# Patient Record
Sex: MALE | Race: WHITE | NOT HISPANIC OR LATINO | URBAN - METROPOLITAN AREA
[De-identification: names, ages, dates, MRNs, and addresses within clinical notes are randomized per-mention and may not be internally consistent; named-entity substitution may affect disease eponyms.]

---

## 2022-12-02 ENCOUNTER — EMERGENCY (EMERGENCY)
Facility: HOSPITAL | Age: 37
LOS: 1 days | Discharge: HOME | End: 2022-12-02
Attending: STUDENT IN AN ORGANIZED HEALTH CARE EDUCATION/TRAINING PROGRAM | Admitting: STUDENT IN AN ORGANIZED HEALTH CARE EDUCATION/TRAINING PROGRAM
Payer: COMMERCIAL

## 2022-12-02 VITALS
OXYGEN SATURATION: 99 % | HEIGHT: 60 IN | TEMPERATURE: 100 F | WEIGHT: 85.1 LBS | SYSTOLIC BLOOD PRESSURE: 133 MMHG | RESPIRATION RATE: 16 BRPM | HEART RATE: 70 BPM | DIASTOLIC BLOOD PRESSURE: 79 MMHG

## 2022-12-02 VITALS — RESPIRATION RATE: 16 BRPM | TEMPERATURE: 98 F | OXYGEN SATURATION: 99 % | HEART RATE: 74 BPM

## 2022-12-02 DIAGNOSIS — M48.02 SPINAL STENOSIS, CERVICAL REGION: ICD-10-CM

## 2022-12-02 DIAGNOSIS — R05.9 COUGH, UNSPECIFIED: ICD-10-CM

## 2022-12-02 DIAGNOSIS — R07.89 OTHER CHEST PAIN: ICD-10-CM

## 2022-12-02 DIAGNOSIS — M25.78 OSTEOPHYTE, VERTEBRAE: ICD-10-CM

## 2022-12-02 DIAGNOSIS — R50.9 FEVER, UNSPECIFIED: ICD-10-CM

## 2022-12-02 DIAGNOSIS — R09.89 OTHER SPECIFIED SYMPTOMS AND SIGNS INVOLVING THE CIRCULATORY AND RESPIRATORY SYSTEMS: ICD-10-CM

## 2022-12-02 DIAGNOSIS — Z20.822 CONTACT WITH AND (SUSPECTED) EXPOSURE TO COVID-19: ICD-10-CM

## 2022-12-02 DIAGNOSIS — R07.0 PAIN IN THROAT: ICD-10-CM

## 2022-12-02 LAB
ALBUMIN SERPL ELPH-MCNC: 4.8 G/DL — SIGNIFICANT CHANGE UP (ref 3.3–5)
ALP SERPL-CCNC: 64 U/L — SIGNIFICANT CHANGE UP (ref 40–120)
ALT FLD-CCNC: 33 U/L — SIGNIFICANT CHANGE UP (ref 10–45)
ANION GAP SERPL CALC-SCNC: 11 MMOL/L — SIGNIFICANT CHANGE UP (ref 5–17)
APTT BLD: 26.9 SEC — LOW (ref 27.5–35.5)
AST SERPL-CCNC: 42 U/L — HIGH (ref 10–40)
BASOPHILS # BLD AUTO: 0.06 K/UL — SIGNIFICANT CHANGE UP (ref 0–0.2)
BASOPHILS NFR BLD AUTO: 0.7 % — SIGNIFICANT CHANGE UP (ref 0–2)
BILIRUB SERPL-MCNC: 0.4 MG/DL — SIGNIFICANT CHANGE UP (ref 0.2–1.2)
BUN SERPL-MCNC: 24 MG/DL — HIGH (ref 7–23)
CALCIUM SERPL-MCNC: 9.4 MG/DL — SIGNIFICANT CHANGE UP (ref 8.4–10.5)
CHLORIDE SERPL-SCNC: 101 MMOL/L — SIGNIFICANT CHANGE UP (ref 96–108)
CO2 SERPL-SCNC: 25 MMOL/L — SIGNIFICANT CHANGE UP (ref 22–31)
CREAT SERPL-MCNC: 1.34 MG/DL — HIGH (ref 0.5–1.3)
EGFR: 70 ML/MIN/1.73M2 — SIGNIFICANT CHANGE UP
EOSINOPHIL # BLD AUTO: 0.09 K/UL — SIGNIFICANT CHANGE UP (ref 0–0.5)
EOSINOPHIL NFR BLD AUTO: 1.1 % — SIGNIFICANT CHANGE UP (ref 0–6)
GLUCOSE SERPL-MCNC: 118 MG/DL — HIGH (ref 70–99)
HCT VFR BLD CALC: 44.1 % — SIGNIFICANT CHANGE UP (ref 39–50)
HGB BLD-MCNC: 14.8 G/DL — SIGNIFICANT CHANGE UP (ref 13–17)
IMM GRANULOCYTES NFR BLD AUTO: 0.1 % — SIGNIFICANT CHANGE UP (ref 0–0.9)
INR BLD: 1 — SIGNIFICANT CHANGE UP (ref 0.88–1.16)
LYMPHOCYTES # BLD AUTO: 2.15 K/UL — SIGNIFICANT CHANGE UP (ref 1–3.3)
LYMPHOCYTES # BLD AUTO: 26.2 % — SIGNIFICANT CHANGE UP (ref 13–44)
MCHC RBC-ENTMCNC: 29.7 PG — SIGNIFICANT CHANGE UP (ref 27–34)
MCHC RBC-ENTMCNC: 33.6 GM/DL — SIGNIFICANT CHANGE UP (ref 32–36)
MCV RBC AUTO: 88.6 FL — SIGNIFICANT CHANGE UP (ref 80–100)
MONOCYTES # BLD AUTO: 0.47 K/UL — SIGNIFICANT CHANGE UP (ref 0–0.9)
MONOCYTES NFR BLD AUTO: 5.7 % — SIGNIFICANT CHANGE UP (ref 2–14)
NEUTROPHILS # BLD AUTO: 5.43 K/UL — SIGNIFICANT CHANGE UP (ref 1.8–7.4)
NEUTROPHILS NFR BLD AUTO: 66.2 % — SIGNIFICANT CHANGE UP (ref 43–77)
NRBC # BLD: 0 /100 WBCS — SIGNIFICANT CHANGE UP (ref 0–0)
PLATELET # BLD AUTO: 210 K/UL — SIGNIFICANT CHANGE UP (ref 150–400)
POTASSIUM SERPL-MCNC: 4.2 MMOL/L — SIGNIFICANT CHANGE UP (ref 3.5–5.3)
POTASSIUM SERPL-SCNC: 4.2 MMOL/L — SIGNIFICANT CHANGE UP (ref 3.5–5.3)
PROT SERPL-MCNC: 7.8 G/DL — SIGNIFICANT CHANGE UP (ref 6–8.3)
PROTHROM AB SERPL-ACNC: 11.9 SEC — SIGNIFICANT CHANGE UP (ref 10.5–13.4)
RBC # BLD: 4.98 M/UL — SIGNIFICANT CHANGE UP (ref 4.2–5.8)
RBC # FLD: 13.1 % — SIGNIFICANT CHANGE UP (ref 10.3–14.5)
SARS-COV-2 RNA SPEC QL NAA+PROBE: NEGATIVE — SIGNIFICANT CHANGE UP
SODIUM SERPL-SCNC: 137 MMOL/L — SIGNIFICANT CHANGE UP (ref 135–145)
WBC # BLD: 8.21 K/UL — SIGNIFICANT CHANGE UP (ref 3.8–10.5)
WBC # FLD AUTO: 8.21 K/UL — SIGNIFICANT CHANGE UP (ref 3.8–10.5)

## 2022-12-02 PROCEDURE — 96375 TX/PRO/DX INJ NEW DRUG ADDON: CPT

## 2022-12-02 PROCEDURE — 71250 CT THORAX DX C-: CPT | Mod: MG

## 2022-12-02 PROCEDURE — G1004: CPT

## 2022-12-02 PROCEDURE — 85025 COMPLETE CBC W/AUTO DIFF WBC: CPT

## 2022-12-02 PROCEDURE — 96374 THER/PROPH/DIAG INJ IV PUSH: CPT

## 2022-12-02 PROCEDURE — 85730 THROMBOPLASTIN TIME PARTIAL: CPT

## 2022-12-02 PROCEDURE — 71250 CT THORAX DX C-: CPT | Mod: 26,MG

## 2022-12-02 PROCEDURE — 93005 ELECTROCARDIOGRAM TRACING: CPT

## 2022-12-02 PROCEDURE — 99284 EMERGENCY DEPT VISIT MOD MDM: CPT

## 2022-12-02 PROCEDURE — 70490 CT SOFT TISSUE NECK W/O DYE: CPT | Mod: 26,MG

## 2022-12-02 PROCEDURE — 99285 EMERGENCY DEPT VISIT HI MDM: CPT | Mod: 25

## 2022-12-02 PROCEDURE — 36415 COLL VENOUS BLD VENIPUNCTURE: CPT

## 2022-12-02 PROCEDURE — 87635 SARS-COV-2 COVID-19 AMP PRB: CPT

## 2022-12-02 PROCEDURE — 70490 CT SOFT TISSUE NECK W/O DYE: CPT | Mod: MG

## 2022-12-02 PROCEDURE — 85610 PROTHROMBIN TIME: CPT

## 2022-12-02 PROCEDURE — 80053 COMPREHEN METABOLIC PANEL: CPT

## 2022-12-02 RX ORDER — SODIUM CHLORIDE 9 MG/ML
1000 INJECTION INTRAMUSCULAR; INTRAVENOUS; SUBCUTANEOUS ONCE
Refills: 0 | Status: COMPLETED | OUTPATIENT
Start: 2022-12-02 | End: 2022-12-02

## 2022-12-02 RX ORDER — ACETAMINOPHEN 500 MG
575 TABLET ORAL ONCE
Refills: 0 | Status: COMPLETED | OUTPATIENT
Start: 2022-12-02 | End: 2022-12-02

## 2022-12-02 RX ORDER — FAMOTIDINE 10 MG/ML
20 INJECTION INTRAVENOUS ONCE
Refills: 0 | Status: COMPLETED | OUTPATIENT
Start: 2022-12-02 | End: 2022-12-02

## 2022-12-02 RX ADMIN — Medication 230 MILLIGRAM(S): at 21:33

## 2022-12-02 RX ADMIN — FAMOTIDINE 20 MILLIGRAM(S): 10 INJECTION INTRAVENOUS at 20:56

## 2022-12-02 RX ADMIN — SODIUM CHLORIDE 1000 MILLILITER(S): 9 INJECTION INTRAMUSCULAR; INTRAVENOUS; SUBCUTANEOUS at 20:56

## 2022-12-02 NOTE — ED PROVIDER NOTE - NSFOLLOWUPINSTRUCTIONS_ED_ALL_ED_FT
Please reach out to Sari Britton ([facility] ED clinical referral coordinator) to assist you with your follow-up appointment.     Monday - Friday 11am-7pm  [phone]  [email or web address]    Please call the Gastroenterology office: [phone] on Monday to make a followup appointment.    1. You were seen for sensation in throat. A copy of any of your resulted labs, imaging, and findings have been provided to you. Make sure to view any test results that may not have yet resulted at the time of your discharge by creating a FollowMyHealth account at: [email or web address] to sign up for FollowMyHealth. Please review all of your lab, imaging, and all other results in their entirety with your primary care doctor.   2. Continue to take your home medications as prescribed.   3. Follow up with the gastroenterologist (see above) and your primary care doctor within 48 hours to assess the symptoms you were seen for in the emergency department and to review all results from your visit. If you don't have a doctor, call [phone] to make an appointment.  4. Return immediately to the emergency department for new, persistent, or worsening symptoms or signs. Return immediately to the emergency department if you have chest pain, shortness of breath, loss of consciousness, hoarseness, drooling, trouble speaking or swallowing, persistent throat pain, chest pain, or trouble breathing.  5. For your for health, you should make healthy food choices and be physically active. Also, you should not smoke or use drugs, and you should not drink alcohol in excess. Please visit [email or web address] for resources and more information.

## 2022-12-02 NOTE — ED ADULT NURSE NOTE - CAS ELECT INFOMATION PROVIDED
Pt D/C with outpatient follow-up, D/C no distress, protecting airway, even unlabored breathing, speaking full sentences./DC instructions

## 2022-12-02 NOTE — ED PROVIDER NOTE - CLINICAL SUMMARY MEDICAL DECISION MAKING FREE TEXT BOX
36 y/o male w/ no sig pmh p/w c/f fish bone stuck in esophagus since 10pm last night.  States was eating branzino, swallowed bone and has been feeling sensation of foreign body at base of neck since.  States at time felt like he was choking and coughing a lot.  Did not receive Heimlich.  Tried to make self vomit without relief.  States last night felt slight chest tightness, thinks he was having a panic attack at time of event.  Has been able to tolerate both food and water throughout day today.  Presents to ED as foreign body sensation has not gone away.  Denies f/c, cough, sob, abd pain, n/d.  VS notable for fever (100.4), otherwise WNL  Pt tolerating secretions, speaking in full sentences.  No signs acute airway compromise at this time.  Unclear source of fever at this time  Plan for CT neck/chest to eval for retained foreign body  Will get basic labs, screening ekg, covid swab  IV tylenol for pain, NPO  Anticipate GI consult, re-eval

## 2022-12-02 NOTE — CONSULT NOTE ADULT - SUBJECTIVE AND OBJECTIVE BOX
HPI: 37y Male with no PMH presenting with possible foreign body. He reports a choking/coughing episode last night around 10pm while eating branzino for dinner. He reports a globus sensation that he localizes to cricoid region. Also reports some chest tightness. After the episode he tried to induce vomiting. He has been tolerating PO throughout the day. He denies shortness of breath, difficulty swallowing, heavy drooling, voice changes, or abdominal complaints. He denies fevers/sweats/chills.     In the ED noted to have low grade fever of 100.4. Vitals otherwise wnl. CBC wnl. BMP notable for Creatinine 1.34. CT neck and chest were negative for radiopaque foreign bodies.     ALLERGIES  No Known Allergies    PAST MEDICAL & SURGICAL HISTORY:  No pertinent past medical history  No significant past surgical history    MEDICATIONS:  None      SOCIAL HISTORY:  unknown    FAMILY HISTORY:  unknown    REVIEW OF SYSTEMS:   per HPI    LABS:  CBC-                        14.8   8.21  )-----------( 210      ( 02 Dec 2022 18:58 )             44.1     12-02    137  |  101  |  24<H>  ----------------------------<  118<H>  4.2   |  25  |  1.34<H>    Ca    9.4      02 Dec 2022 18:58    TPro  7.8  /  Alb  4.8  /  TBili  0.4  /  DBili  x   /  AST  42<H>  /  ALT  33  /  AlkPhos  64  12-02    Coagulation Studies-  PT/INR - ( 02 Dec 2022 18:58 )   PT: 11.9 sec;   INR: 1.00          PTT - ( 02 Dec 2022 18:58 )  PTT:26.9 sec  Endocrine Panel-  --  --  9.4 mg/dL      Vital Signs Last 24 Hrs  T(C): 38 (02 Dec 2022 17:46), Max: 38 (02 Dec 2022 17:46)  T(F): 100.4 (02 Dec 2022 17:46), Max: 100.4 (02 Dec 2022 17:46)  HR: 70 (02 Dec 2022 17:46) (70 - 70)  BP: 133/79 (02 Dec 2022 17:46) (133/79 - 133/79)  BP(mean): --  RR: 16 (02 Dec 2022 17:46) (16 - 16)  SpO2: 99% (02 Dec 2022 17:46) (99% - 99%)    Parameters below as of 02 Dec 2022 17:46  Patient On (Oxygen Delivery Method): room air    PHYSICAL EXAM:  General: NAD, A+Ox3  Respiratory: No respiratory distress, stridor, or stertor  Voice quality: normal  Face:  Symmetric without masses or lesions  OU: EOMI  AD: Pinna wnl  AS: Pinna wnl  Nose: nasal cavity clear anteriorly  OC/OP: tongue normal, floor of mouth WNL, no masses or lesions, OP clear, no foreign objects visualized  Neck: soft/flat, no LAD, no tenderness to palpation  Neuro: CNII-XII intact    LARYNGOSCOPY EXAM:   Verbal consent was obtained from patient prior to procedure.  Indication: possible foreign body  Anesthesia: none  Flexible laryngoscopy was performed and revealed the following:    Nasopharynx had no mass or exudate.    Base of tongue was symmetric and not enlarged.    Vallecula was clear    Epiglottis, both aryepiglottic folds and both false vocal folds were normal    + Arytenoids edema, no erythema     True vocal folds were fully mobile and without lesions.     + Post cricoid edema    + Interarytenoid edema  No foreign bodies visualized down to subglottis. No bleeding, clots, crusts, lacerations.  The patient tolerated the procedure well.      RADIOLOGY & ADDITIONAL STUDIES:  ACC: [id number] EXAM:  CT NECK SOFT TISSUE                        PROCEDURE DATE:  12/02/2022    INTERPRETATION:  Technique: A thin section axial acquisition was   performed from the skull base to the thoracic inlet.  The data was   reformatted in the sagittal, coronal and axial planes.  Contrast: None  Clinical Information: Foreign body sensation, suspected retained fishbone  Prior Studies: None  Findings:  *  Aerodigestive Tract: No radiopaque foreign body identified nor is   there a focal area of swelling or masslike asymmetry. No retropharyngeal   edema.  *  Lymph Nodes: No cervical lymphadenopathy  *  Salivary Glands: Unremarkable  *  Thyroid Gland: Mildly heterogeneous without enlargement or dominant   nodule  *  Orbits: No abnormal soft tissue or mass effect  *  Brain: Included portions are unremarkable  *  Skull Base: Unremarkable  *  Paranasal Sinuses: Mild sites of mucosal thickening without acute   disease.  *  Mastoids: Clear  *  Cervical Spine: No acute osseous finding. There is disc degeneration   with circumferential osteophyte at C5-6 with mild-moderate spinal   stenosis.  *  Lung Apices: Limited portions are unremarkable  IMPRESSION:  No upper aerodigestive tract foreign body identified.  --- End of Report ---  JOBY DAVIS MD; Attending Radiologist  This document has been electronically signed. Dec  2 2022  8:05PM      ACC: [id number] EXAM:  CT CHEST                        PROCEDURE DATE:  12/02/2022    INTERPRETATION:  CLINICAL INFORMATION: 37-year-old male  COMPARISON: None.  CONTRAST/COMPLICATIONS:  IV Contrast: None  Oral Contrast: None  Complications: None known  PROCEDURE:  CT of the Chest was performed.  Sagittal and coronal reformats were performed.  FINDINGS:  LUNGS AND AIRWAYS: Visualized central airways are patent. No radiopaque   foreign body is identified.  4 mm probable intrapulmonary lymph node   within the subpleural region of the medial aspect of the posterior basal   segment of the left lower lobe (13:65).  PLEURA: No pleural effusion.  MEDIASTINUM AND JING: No lymphadenopathy.  VESSELS: Within normal limits.  HEART: Heart size is normal. No pericardial effusion.  CHEST WALL AND LOWER NECK: 2 linear radiopaque structures seen in the   region of the posterior aspect of the thyroid cartilage  VISUALIZED UPPER ABDOMEN: Within normal limits. No radiopaque foreign   body identified within the esophagus or upper abdomen.  BONES: Within normal limits.  IMPRESSION:  1. No upper aerodigestive tract foreign body identified.  The results of this examination were verbally communicated with read back   to the physician, SAMANTHA HELLERMAN [id number], on 12/2/2022 at 9:44 PM.  --- End of Report ---  EDWARD ELLISON MD; Attending Radiologist  This document has been electronically signed. Dec  2 2022  9:45PM      A/P:  37y Male no PMH presenting with possible foreign body in aerodigestive track. CT neck and chest negative for radiopaque foreign body. Fiberoptic exam down to subglottis negative for foreign body. Patient is tolerating PO.    Recommendations:  - Okay for discharge from ENT standpoint with return precautions.  - Discussed with attending.      Thank you for the consult, please page ENT at [phone] with any questions/concerns.  ----------------------------------------------------    Department of Otolaryngology - Head and Neck Surgery  [facility]

## 2022-12-02 NOTE — ED ADULT TRIAGE NOTE - CHIEF COMPLAINT QUOTE
Pt presents to the ED c/o fish bone stuck in throat. Pt states, "I had branzino yesterday and I feel like its stuck in my throat. Pt speaking in full sentences without respiratory distress.

## 2022-12-02 NOTE — ED PROVIDER NOTE - NS ED ATTENDING STATEMENT MOD
I have seen and examined this patient and fully participated in the care of this patient as the teaching attending.  The service was shared with the KIM.  I reviewed and verified the documentation and independently performed the documented:

## 2022-12-02 NOTE — ED PROVIDER NOTE - CHIEF COMPLAINT
The patient is a 37y Male complaining of foreign body throat. ICU Vital Signs Last 24 Hrs  T(C): 36.7 (15 Aug 2018 08:22), Max: 36.7 (15 Aug 2018 08:22)  T(F): 98 (15 Aug 2018 08:22), Max: 98 (15 Aug 2018 08:22)  HR: 95 (15 Aug 2018 08:22) (95 - 95)  BP: 108/60 (15 Aug 2018 08:22) (108/60 - 108/60)  BP(mean): --  ABP: --  ABP(mean): --  RR: 20 (15 Aug 2018 08:22) (20 - 20)  SpO2: 98% (15 Aug 2018 08:22) (98% - 98%)    PHYSICAL EXAM:  GENERAL: NAD, speaks in full sentences, no signs of respiratory distress  HEAD:  Atraumatic, Normocephalic  EYES: EOMI, PERRLA, conjunctiva and sclera clear  NECK: Supple, No JVD  CHEST/LUNG: Clear to auscultation bilaterally; No wheeze; No crackles; No accessory muscles used  HEART: Regular rate and rhythm; No murmurs;   ABDOMEN: Soft, Nontender, Nondistended; Bowel sounds present; No guarding  EXTREMITIES:  2+ Peripheral Pulses, No cyanosis or edema  PSYCH: AAOx3  NEUROLOGY: non-focal  SKIN: No rashes or lesions ICU Vital Signs Last 24 Hrs  T(C): 36.7 (15 Aug 2018 08:22), Max: 36.7 (15 Aug 2018 08:22)  T(F): 98 (15 Aug 2018 08:22), Max: 98 (15 Aug 2018 08:22)  HR: 95 (15 Aug 2018 08:22) (95 - 95)  BP: 108/60 (15 Aug 2018 08:22) (108/60 - 108/60)  BP(mean): --  ABP: --  ABP(mean): --  RR: 20 (15 Aug 2018 08:22) (20 - 20)  SpO2: 98% (15 Aug 2018 08:22) (98% - 98%)    PHYSICAL EXAM:  GENERAL: NAD, speaks in full sentences, no signs of respiratory distress  HEAD:  Atraumatic, Normocephalic  EYES: EOMI, PERRLA, conjunctiva and sclera clear  NECK: Supple, No JVD  CHEST/LUNG: Clear to auscultation bilaterally; No wheeze; No crackles; No accessory muscles used  HEART: Regular rate and rhythm; No murmurs;   ABDOMEN: Soft, diffuse mild tenderness, Tense abdomen, distended  EXTREMITIES:  no edema  PSYCH: AAOx3  NEUROLOGY: non-focal

## 2022-12-02 NOTE — ED PROVIDER NOTE - PATIENT PORTAL LINK FT
You can access the FollowMyHealth Patient Portal offered by [facility] by registering at the following website: [email or web address]. By joining [facility]’s FollowMyHealth portal, you will also be able to view your health information using other applications (apps) compatible with our system.

## 2022-12-02 NOTE — ED PROVIDER NOTE - NS ED ROS FT
CONSTITUTIONAL: Denies fever and chills    HEENT: +cough    RESPIRATORY: Denies SOB    CARDIOVASCULAR: +chest pain    GASTROINTESTINAL: Denies abdominal pain, nausea, vomiting and diarrhea.

## 2022-12-02 NOTE — ED PROVIDER NOTE - ATTENDING CONTRIBUTION TO CARE
38 y/o male w/ no sig pmh p/w c/f fish bone stuck in esophagus since 10pm last night, feels a "scratching sensation" in his throat and chest. denies drooling/sob/dysphonia. On exam, T100.4 VS otherwise wnl, a/w patent uvula midline no stridor/trismus/increased wob/wheezes/ronchi/crackles/retractions. no intraoral lesions/fb. no tongue elevation or tongue/lip swelling. no neck ttp or rash. CT neck and Chest and CXR are negative for FB. covid negative. GI and ENT were consulted. ENT consulted and scoped pt at bedside - no FB visualized, stated that FB unlikely but touch base with GI. d/w GI who offered to scope (non-emergently) pt as precaution. d/w pt who states he feels moreso that the sensation in his throat feels like a "scratch" rather than a retained foreign body and he doesn't want to stay in the hospital for the scope. d/w GI who are okay with this plan and recommended strict return precautions and they will scope him if he returns and that he can be discharged and call GI office to schedule outpatient followup appointment. pt amenable to this plan speaking in full sentences without difficulty in nad will dc with gi followup

## 2022-12-02 NOTE — ED PROVIDER NOTE - OBJECTIVE STATEMENT
38 y/o male w/ no sig pmh p/w c/f fish bone stuck in esophagus since 10pm last night.  States was eating branzino, swallowed bone and has been feeling sensation of foreign body at base of neck since.  States at time felt like he was choking and coughing a lot.  Did not receive Heimlich.  Tried to make self vomit, unsuccessfully without relief.  States last night felt slight chest tightness, thinks he was having a panic attack at time of event.  Has been able to tolerate both food and water throughout day today.  Presents to ED as foreign body sensation has not gone away.  Denies f/c, cough, sob, abd pain, n/d.

## 2022-12-02 NOTE — ED PROVIDER NOTE - PHYSICAL EXAMINATION
CONSTITUTIONAL: Awake, alert.  Nontoxic, no acute distress.    HEAD: Normocephalic, atraumatic.    EYES: Conjunctivae clear without exudates or hemorrhage. Sclera is non-icteric.    ENT: ENT:  Ears: External ear normal appearing without tenderness  Nose: Normal appearing nose, nasal mucosa is pink and moist. The nasal septum is midline, no septal hematoma. Nares are patent bilaterally.  Throat: Oral mucosa is pink and moist with good dentition. Tongue normal in appearance without lesions and with good symmetrical movement. No buccal nodules or lesions are noted. The pharynx is normal in appearance without tonsillar swelling or exudates.  Uvula midine.  No trismus.  No submandibular/ submental lymphadenopathy.  Speaking in full sentences, tolerating secretions.    NECK: supple, trachea midline.    HEART:  Normal rate, regular rhythm.  Heart sounds S1, S2.  No murmurs, rubs or gallops.    LUNGS:  No acute respiratory distress.  Non-tachypneic and non-labored.  Lungs are clear bilaterally with good aeration.  No wheezing, rales, rhonchi.    ABDOMEN: Normal appearing skin without lesions, rashes.  Normal bowel sounds x 4.  Soft, non-distended, non-tender in all four quadrants.       SKIN: Skin in warm, dry and intact without rashes or lesions.  Appropriate color for ethnicity.    NEUROLOGICAL:  Patient is alert, oriented x person, place and time.    PSYCH: Appropriate mood and affect. Good judgment and insight.

## 2022-12-02 NOTE — CHART NOTE - NSCHARTNOTEFT_GEN_A_CORE
GI notified that patient felt a foreign body sensation in throat after chocking on a fish bone yesterday. CT neck and CT chest performed and findings without any foreign body identified in esophagus, focal edema, or mass like asymmetry. ENT consulted for laryngoscopy, reportedly unrevealing exam. Given persistent sensation with normal findings, GI recommending endoscopy tomorrow for further evaluation. Patient declined admission stating he felt reassured by CT and laryngoscopy and felt sensation was not so bad.     Patient should return to the ED for urgent endoscopy if he develops worsening pain, swelling, dysphagia, PO intolerance, shortness of breath or any other concerning finding.    Patient can schedule GI follow-up at the fellows clinic located on the fourth floor of [facility] [address] by calling the office at [phone]    Case discussed with ED attending and Dr. Mcgarry GI notified that patient felt a foreign body sensation in throat after chocking on a fish bone yesterday. He currently is tolerating PO without issues. CT neck and CT chest performed and findings without any foreign body identified in esophagus, focal edema, or mass like asymmetry. ENT consulted for laryngoscopy, reportedly unrevealing exam. Given persistent sensation with normal findings, GI recommending endoscopy tomorrow for further evaluation. Patient declined admission stating he felt reassured by CT and laryngoscopy and felt sensation was not so bad.     Patient should return to the ED for urgent endoscopy if he develops worsening pain, swelling, dysphagia, PO intolerance, shortness of breath or any other concerning finding.    Patient can schedule GI follow-up at the fellows clinic located on the fourth floor of [facility] [address] by calling the office at [phone]    Case discussed with ED attending and Dr. Mcgarry